# Patient Record
Sex: MALE | NOT HISPANIC OR LATINO | Employment: FULL TIME | URBAN - METROPOLITAN AREA
[De-identification: names, ages, dates, MRNs, and addresses within clinical notes are randomized per-mention and may not be internally consistent; named-entity substitution may affect disease eponyms.]

---

## 2017-07-06 ENCOUNTER — HOSPITAL ENCOUNTER (EMERGENCY)
Facility: HOSPITAL | Age: 52
Discharge: HOME | End: 2017-07-06
Attending: FAMILY MEDICINE | Admitting: FAMILY MEDICINE

## 2017-07-06 VITALS
OXYGEN SATURATION: 97 % | TEMPERATURE: 97.7 F | SYSTOLIC BLOOD PRESSURE: 101 MMHG | HEART RATE: 78 BPM | WEIGHT: 129 LBS | RESPIRATION RATE: 16 BRPM | HEIGHT: 68 IN | DIASTOLIC BLOOD PRESSURE: 61 MMHG | BODY MASS INDEX: 19.55 KG/M2

## 2017-07-06 DIAGNOSIS — S51.811A LACERATION OF RIGHT FOREARM, INITIAL ENCOUNTER: ICD-10-CM

## 2017-07-06 DIAGNOSIS — F32.9 REACTIVE DEPRESSION: Primary | ICD-10-CM

## 2017-07-06 LAB
AMPHET+METHAMPHET UR QL: NEGATIVE
BARBITURATES UR QL SCN: NEGATIVE
BENZODIAZ UR QL SCN: NEGATIVE
CANNABINOIDS SERPL QL: NEGATIVE
COCAINE UR QL: NEGATIVE
ETHANOL BLD-MCNC: 266 MG/DL (ref 0–10)
ETHANOL BLD-MCNC: 383 MG/DL (ref 0–10)
ETHANOL UR QL: 0.27 %
ETHANOL UR QL: 0.38 %
HOLD SPECIMEN: NORMAL
HOLD SPECIMEN: NORMAL
METHADONE UR QL SCN: NEGATIVE
OPIATES UR QL: NEGATIVE
OXYCODONE UR QL SCN: NEGATIVE
WHOLE BLOOD HOLD SPECIMEN: NORMAL
WHOLE BLOOD HOLD SPECIMEN: NORMAL

## 2017-07-06 PROCEDURE — 80307 DRUG TEST PRSMV CHEM ANLYZR: CPT | Performed by: FAMILY MEDICINE

## 2017-07-06 PROCEDURE — 96360 HYDRATION IV INFUSION INIT: CPT

## 2017-07-06 PROCEDURE — 90791 PSYCH DIAGNOSTIC EVALUATION: CPT

## 2017-07-06 PROCEDURE — 99285 EMERGENCY DEPT VISIT HI MDM: CPT

## 2017-07-06 RX ADMIN — SODIUM CHLORIDE 1000 ML: 9 INJECTION, SOLUTION INTRAVENOUS at 19:02

## 2017-07-06 NOTE — ED PROVIDER NOTES
"Pt presents to ED with a suicide attempt onset PTA. He states he felt \"cornered\" which is why he cut himself. On exam, pt has two fairly long minor lacerations to R forearm. Pt's ethanol level at this time is 383. He is pleasant and upbeat.    1700 Pt is arousable and in no distress. Will consult ACCESS.    2200 He is now alert and penitent over his lacs.  Denies any SI presently and is comfortable going home.  ACCESS concurs.      I supervised care provided by the midlevel provider.    We have discussed this patient's history, physical exam, and treatment plan.   I have reviewed the note and personally saw and examined the patient and agree with the plan of care.  Documentation assistance provided by eleanor Meyer for Dr. Gibbs.  Information recorded by the scribe was done at my direction and has been verified and validated by me.       Nikolay Meyer  07/06/17 8872       Maldonado Gibbs MD  07/07/17 0237       Maldonado Gibbs MD  07/07/17 0237    "
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
## 2017-07-06 NOTE — ED NOTES
Pt reports suicide attempt by using a kitchen knife to cut his right wrist @1 hour ago. Pt also reports consuming a large amount of alcohol today.      Nati Yousif RN  07/06/17 8995

## 2017-07-07 NOTE — DISCHARGE INSTRUCTIONS
Return if you worsen or have new symptoms.    Keep lacerations clean and dress daily until healed.
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
## 2021-04-25 ENCOUNTER — IMMUNIZATION (OUTPATIENT)
Dept: VACCINE CLINIC | Facility: HOSPITAL | Age: 56
End: 2021-04-25

## 2021-04-25 PROCEDURE — 91300 HC SARSCOV02 VAC 30MCG/0.3ML IM: CPT | Performed by: INTERNAL MEDICINE

## 2021-04-25 PROCEDURE — 0001A: CPT | Performed by: INTERNAL MEDICINE

## 2021-05-16 ENCOUNTER — APPOINTMENT (OUTPATIENT)
Dept: VACCINE CLINIC | Facility: HOSPITAL | Age: 56
End: 2021-05-16

## 2021-05-17 ENCOUNTER — IMMUNIZATION (OUTPATIENT)
Dept: VACCINE CLINIC | Facility: HOSPITAL | Age: 56
End: 2021-05-17

## 2021-05-17 PROCEDURE — 0002A: CPT | Performed by: INTERNAL MEDICINE

## 2021-05-17 PROCEDURE — 91300 HC SARSCOV02 VAC 30MCG/0.3ML IM: CPT | Performed by: INTERNAL MEDICINE

## 2025-07-21 ENCOUNTER — APPOINTMENT (OUTPATIENT)
Dept: CT IMAGING | Facility: HOSPITAL | Age: 60
End: 2025-07-21
Payer: MEDICAID

## 2025-07-21 ENCOUNTER — HOSPITAL ENCOUNTER (EMERGENCY)
Facility: HOSPITAL | Age: 60
Discharge: HOME | End: 2025-07-21
Attending: EMERGENCY MEDICINE | Admitting: EMERGENCY MEDICINE
Payer: MEDICAID

## 2025-07-21 VITALS
DIASTOLIC BLOOD PRESSURE: 99 MMHG | TEMPERATURE: 97.5 F | BODY MASS INDEX: 22.73 KG/M2 | RESPIRATION RATE: 16 BRPM | SYSTOLIC BLOOD PRESSURE: 135 MMHG | HEIGHT: 68 IN | WEIGHT: 150 LBS | OXYGEN SATURATION: 91 % | HEART RATE: 70 BPM

## 2025-07-21 DIAGNOSIS — D64.9 ANEMIA, UNSPECIFIED TYPE: ICD-10-CM

## 2025-07-21 DIAGNOSIS — H53.8 BLURRED VISION, BILATERAL: Primary | ICD-10-CM

## 2025-07-21 LAB
ANION GAP SERPL CALCULATED.3IONS-SCNC: 7 MMOL/L (ref 5–15)
BASOPHILS # BLD AUTO: 0.03 10*3/MM3 (ref 0–0.2)
BASOPHILS NFR BLD AUTO: 0.4 % (ref 0–1.5)
BUN SERPL-MCNC: 8.9 MG/DL (ref 8–23)
BUN/CREAT SERPL: 16.8 (ref 7–25)
CALCIUM SPEC-SCNC: 9.8 MG/DL (ref 8.6–10.5)
CHLORIDE SERPL-SCNC: 101 MMOL/L (ref 98–107)
CO2 SERPL-SCNC: 26 MMOL/L (ref 22–29)
CREAT SERPL-MCNC: 0.53 MG/DL (ref 0.76–1.27)
DEPRECATED RDW RBC AUTO: 50.7 FL (ref 37–54)
EGFRCR SERPLBLD CKD-EPI 2021: 114.7 ML/MIN/1.73
EOSINOPHIL # BLD AUTO: 0.03 10*3/MM3 (ref 0–0.4)
EOSINOPHIL NFR BLD AUTO: 0.4 % (ref 0.3–6.2)
ERYTHROCYTE [DISTWIDTH] IN BLOOD BY AUTOMATED COUNT: 13.6 % (ref 12.3–15.4)
GLUCOSE SERPL-MCNC: 100 MG/DL (ref 65–99)
HCT VFR BLD AUTO: 26.6 % (ref 37.5–51)
HGB BLD-MCNC: 8.8 G/DL (ref 13–17.7)
IMM GRANULOCYTES # BLD AUTO: 0.01 10*3/MM3 (ref 0–0.05)
IMM GRANULOCYTES NFR BLD AUTO: 0.1 % (ref 0–0.5)
LYMPHOCYTES # BLD AUTO: 1.99 10*3/MM3 (ref 0.7–3.1)
LYMPHOCYTES NFR BLD AUTO: 28.5 % (ref 19.6–45.3)
MCH RBC QN AUTO: 33.3 PG (ref 26.6–33)
MCHC RBC AUTO-ENTMCNC: 33.1 G/DL (ref 31.5–35.7)
MCV RBC AUTO: 100.8 FL (ref 79–97)
MONOCYTES # BLD AUTO: 0.43 10*3/MM3 (ref 0.1–0.9)
MONOCYTES NFR BLD AUTO: 6.2 % (ref 5–12)
NEUTROPHILS NFR BLD AUTO: 4.49 10*3/MM3 (ref 1.7–7)
NEUTROPHILS NFR BLD AUTO: 64.4 % (ref 42.7–76)
PLATELET # BLD AUTO: 244 10*3/MM3 (ref 140–450)
PMV BLD AUTO: 9.4 FL (ref 6–12)
POTASSIUM SERPL-SCNC: 4.2 MMOL/L (ref 3.5–5.2)
RBC # BLD AUTO: 2.64 10*6/MM3 (ref 4.14–5.8)
SODIUM SERPL-SCNC: 134 MMOL/L (ref 136–145)
WBC NRBC COR # BLD AUTO: 6.98 10*3/MM3 (ref 3.4–10.8)

## 2025-07-21 PROCEDURE — 80048 BASIC METABOLIC PNL TOTAL CA: CPT | Performed by: EMERGENCY MEDICINE

## 2025-07-21 PROCEDURE — 85025 COMPLETE CBC W/AUTO DIFF WBC: CPT | Performed by: EMERGENCY MEDICINE

## 2025-07-21 PROCEDURE — 99284 EMERGENCY DEPT VISIT MOD MDM: CPT

## 2025-07-21 PROCEDURE — 70450 CT HEAD/BRAIN W/O DYE: CPT

## 2025-07-21 NOTE — FSED PROVIDER NOTE
Subjective   History of Present Illness  61yo male presents ED c/o bilateral blurred vision x2d duration.  Pt reports OS lateral visual field deficit x several months preceding bilateral blurred vision.  Denies headache/diplopia/new vision loss/trauma/paresthesia/motor weakness/eye pain.    History provided by:  Patient  Eye Problem  Associated symptoms: no discharge, no headaches, no itching, no photophobia and no redness        Review of Systems   Constitutional: Negative.    HENT: Negative.     Eyes:  Positive for visual disturbance. Negative for photophobia, pain, discharge, redness and itching.   Respiratory: Negative.     Cardiovascular: Negative.    Gastrointestinal: Negative.    Neurological: Negative.  Negative for headaches.   All other systems reviewed and are negative.      Past Medical History:   Diagnosis Date    Bipolar 1 disorder        No Known Allergies    History reviewed. No pertinent surgical history.    History reviewed. No pertinent family history.    Social History     Socioeconomic History    Marital status: Single   Tobacco Use    Smoking status: Unknown   Substance and Sexual Activity    Alcohol use: Yes    Drug use: No           Objective   Physical Exam  Vitals and nursing note reviewed.   Constitutional:       Appearance: Normal appearance.   HENT:      Head: Normocephalic and atraumatic.      Right Ear: External ear normal.      Left Ear: External ear normal.      Nose: Nose normal.      Mouth/Throat:      Mouth: Mucous membranes are moist.      Pharynx: Oropharynx is clear.   Eyes:      General: Lids are normal. Lids are everted, no foreign bodies appreciated. Gaze aligned appropriately.      Extraocular Movements: Extraocular movements intact.      Conjunctiva/sclera: Conjunctivae normal.      Pupils: Pupils are equal, round, and reactive to light.      Right eye: Pupil is reactive.      Left eye: Pupil is reactive.      Slit lamp exam:     Right eye: No corneal flare, foreign body,  hyphema, hypopyon or photophobia.      Left eye: No corneal flare, hyphema, hypopyon or photophobia.      Visual Fields: Right eye visual fields normal.      Left eye: ABS in the upper temporal quadrant. ABS in the lower temporal quadrant.   Cardiovascular:      Rate and Rhythm: Normal rate and regular rhythm.      Pulses: Normal pulses.      Heart sounds: Normal heart sounds. No murmur heard.     No friction rub. No gallop.   Pulmonary:      Effort: Pulmonary effort is normal.      Breath sounds: Normal breath sounds. No wheezing, rhonchi or rales.   Abdominal:      General: Abdomen is flat. Bowel sounds are normal. There is no distension.      Palpations: Abdomen is soft.      Tenderness: There is no abdominal tenderness.   Musculoskeletal:         General: No swelling or deformity.      Cervical back: Normal range of motion and neck supple. No rigidity.      Right lower leg: No edema.      Left lower leg: No edema.   Lymphadenopathy:      Cervical: No cervical adenopathy.   Skin:     General: Skin is warm and dry.   Neurological:      General: No focal deficit present.      Mental Status: He is alert and oriented to person, place, and time.      GCS: GCS eye subscore is 4. GCS verbal subscore is 5. GCS motor subscore is 6.      Cranial Nerves: Cranial nerves 2-12 are intact. No facial asymmetry.      Sensory: Sensation is intact.      Motor: Motor function is intact.      Coordination: Coordination is intact. Finger-Nose-Finger Test normal.         Procedures           ED Course  ED Course as of 07/21/25 1708   Mon Jul 21, 2025   1606 Visual acuity noted  OS 20/100  OD 20/100 [SD]   1705 D/w Dr. Kim. States will see in office 07.22.2025 for evaluation. [SD]      ED Course User Index  [SD] Kendell Bourgeois MD      Labs Reviewed   BASIC METABOLIC PANEL - Abnormal; Notable for the following components:       Result Value    Glucose 100 (*)     Creatinine 0.53 (*)     Sodium 134 (*)     All other components within  normal limits    Narrative:     GFR Categories in Chronic Kidney Disease (CKD)              GFR Category          GFR (mL/min/1.73)    Interpretation  G1                    90 or greater        Normal or high (1)  G2                    60-89                Mild decrease (1)  G3a                   45-59                Mild to moderate decrease  G3b                   30-44                Moderate to severe decrease  G4                    15-29                Severe decrease  G5                    14 or less           Kidney failure    (1)In the absence of evidence of kidney disease, neither GFR category G1 or G2 fulfill the criteria for CKD.    eGFR calculation 2021 CKD-EPI creatinine equation, which does not include race as a factor   CBC WITH AUTO DIFFERENTIAL - Abnormal; Notable for the following components:    RBC 2.64 (*)     Hemoglobin 8.8 (*)     Hematocrit 26.6 (*)     .8 (*)     MCH 33.3 (*)     All other components within normal limits   CBC AND DIFFERENTIAL    Narrative:     The following orders were created for panel order CBC & Differential.  Procedure                               Abnormality         Status                     ---------                               -----------         ------                     CBC Auto Differential[[id number]]        Abnormal            Final result                 Please view results for these tests on the individual orders.     CT Head Without Contrast  Result Date: 7/21/2025  Narrative: CT SCAN OF THE BRAIN WITHOUT CONTRAST  HISTORY: 60-year-old male with blurred vision.  The CT scan was performed as an emergency procedure through the brain without contrast. There is mild generalized diffuse atrophy slightly out of proportion to the patient's age. There is no evidence of intracranial hemorrhage or mass effect. The visualized sinuses and mastoid air cells are clear. The orbital structures are unremarkable.      Radiation dose reduction techniques were utilized,  including automated exposure control and exposure modulation based on body size.   This report was finalized on 7/21/2025 4:00 PM by Dr. Jaylen Esquivel M.D on Workstation: [email or web address]                                           Medical Decision Making  Problems Addressed:  Anemia, unspecified type: complicated acute illness or injury  Blurred vision, bilateral: complicated acute illness or injury    Amount and/or Complexity of Data Reviewed  Labs: ordered.  Radiology: ordered.        Final diagnoses:   Blurred vision, bilateral   Anemia, unspecified type       ED Disposition  ED Disposition       ED Disposition   Discharge    Condition   Good    Comment   --               PATIENT CONNECTION - [facility] [address]  [phone]  Schedule an appointment as soon as possible for a visit today      Heevr Kim MD  [address] [place] [address]  [phone]      [address] [place] [address], [address], [place], [place].  phone: [phone]         Medication List      No changes were made to your prescriptions during this visit.

## 2025-07-21 NOTE — DISCHARGE INSTRUCTIONS
Followup Dr. Kathleen 07.22.2025 as directed tomorrow  Return ED headache, vision loss, eye pain, bleeding, worse condition, any other concerns